# Patient Record
Sex: MALE | Race: WHITE | URBAN - METROPOLITAN AREA
[De-identification: names, ages, dates, MRNs, and addresses within clinical notes are randomized per-mention and may not be internally consistent; named-entity substitution may affect disease eponyms.]

---

## 2017-05-02 ENCOUNTER — IMPORTED ENCOUNTER (OUTPATIENT)
Dept: URBAN - METROPOLITAN AREA CLINIC 50 | Facility: CLINIC | Age: 67
End: 2017-05-02

## 2017-11-07 ENCOUNTER — IMPORTED ENCOUNTER (OUTPATIENT)
Dept: URBAN - METROPOLITAN AREA CLINIC 50 | Facility: CLINIC | Age: 67
End: 2017-11-07

## 2018-05-08 ENCOUNTER — IMPORTED ENCOUNTER (OUTPATIENT)
Dept: URBAN - METROPOLITAN AREA CLINIC 50 | Facility: CLINIC | Age: 68
End: 2018-05-08

## 2018-11-06 ENCOUNTER — IMPORTED ENCOUNTER (OUTPATIENT)
Dept: URBAN - METROPOLITAN AREA CLINIC 50 | Facility: CLINIC | Age: 68
End: 2018-11-06

## 2019-05-07 ENCOUNTER — IMPORTED ENCOUNTER (OUTPATIENT)
Dept: URBAN - METROPOLITAN AREA CLINIC 50 | Facility: CLINIC | Age: 69
End: 2019-05-07

## 2019-05-21 ENCOUNTER — IMPORTED ENCOUNTER (OUTPATIENT)
Dept: URBAN - METROPOLITAN AREA CLINIC 50 | Facility: CLINIC | Age: 69
End: 2019-05-21

## 2019-12-09 ENCOUNTER — IMPORTED ENCOUNTER (OUTPATIENT)
Dept: URBAN - METROPOLITAN AREA CLINIC 50 | Facility: CLINIC | Age: 69
End: 2019-12-09

## 2020-01-15 ENCOUNTER — IMPORTED ENCOUNTER (OUTPATIENT)
Dept: URBAN - METROPOLITAN AREA CLINIC 50 | Facility: CLINIC | Age: 70
End: 2020-01-15

## 2020-07-15 ENCOUNTER — IMPORTED ENCOUNTER (OUTPATIENT)
Dept: URBAN - METROPOLITAN AREA CLINIC 50 | Facility: CLINIC | Age: 70
End: 2020-07-15

## 2021-01-13 ENCOUNTER — IMPORTED ENCOUNTER (OUTPATIENT)
Dept: URBAN - METROPOLITAN AREA CLINIC 50 | Facility: CLINIC | Age: 71
End: 2021-01-13

## 2021-01-13 NOTE — PATIENT DISCUSSION
"""Continue Artificial tears both eyes two - four times a day ."" ""Continue Pazeo both eyes in the morning

## 2021-01-29 NOTE — PATIENT DISCUSSION
Cataract APPEARS VISUALLY SIGNIFICANT and patient advised to SEE DR ROSS for treatment. DR Delia Josue WAS INFORMED OF THESE FINDINGS.

## 2021-01-29 NOTE — PATIENT DISCUSSION
1/29/21: APPEARS SIGNIFICANT, BUT VA IS OVERALL PRESERVED ([place] 30). DISCUSSED THE IMPORTANCE OF UNDERGOING CATARACT SURGERY TO FURTHER IMPROVE SUCCESS OF SX. 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  WILL EVAL 3-4 WKS AFTER REMOVAL OF CATARACT SX.

## 2021-01-29 NOTE — PATIENT DISCUSSION
Recommended observation. HEALTHY DIET INCLUDING GREEN LEAFY VEGGIES & OMEGA 3 RECOMMENDED. PaxfireLER GRID MONITORING.

## 2021-01-29 NOTE — PATIENT DISCUSSION
Personally reviewed patients records from referring Physician. FINDINGS WERE COMMUNICATED TO DR Sridhar Bashir.

## 2021-04-18 ASSESSMENT — VISUAL ACUITY
OS_SC: 20/30
OS_BAT: 20/40
OD_PH: 20/30
OS_SC: 20/30-
OD_SC: 20/40
OS_CC: J4@ 19 IN
OS_CC: J1
OD_OTHER: 20/50. 20/60.
OS_SC: 20/20
OS_OTHER: 20/50. 20/60.
OD_PH: 20/30-2
OS_OTHER: 20/40+. 20/50.
OD_OTHER: 20/50. 20/70.
OD_PH: 20/40
OS_SC: 20/20-
OD_BAT: 20/50
OS_SC: 20/20
OD_SC: 20/50+2
OD_CC: J4@ 19 IN
OS_BAT: 20/40
OD_SC: 20/40+2
OS_CC: J1@ 16 IN
OD_SC: 20/30
OS_SC: 20/40
OD_SC: 20/30-
OD_CC: J1@ 16 IN
OD_CC: J1+@ 16 IN
OS_CC: J1+@ 16 IN
OD_CC: J1
OS_OTHER: 20/40. 20/50.
OD_BAT: 20/50
OS_SC: 20/30-2+2
OD_SC: 20/30
OS_BAT: 20/50
OD_OTHER: 20/50. 20/60.
OD_BAT: 20/40
OD_PH: 20/25
OD_SC: 20/50+
OD_PH: 20/25
OD_OTHER: 20/40. 20/50.
OS_SC: 20/20-
OD_SC: 20/40+
OS_SC: 20/30-
OD_BAT: 20/50
OD_SC: 20/30
OS_BAT: 20/40+
OS_OTHER: 20/40. 20/50.

## 2021-04-18 ASSESSMENT — TONOMETRY
OD_IOP_MMHG: 14
OD_IOP_MMHG: 14
OD_IOP_MMHG: 13
OD_IOP_MMHG: 14
OS_IOP_MMHG: 13
OS_IOP_MMHG: 18
OD_IOP_MMHG: 15
OS_IOP_MMHG: 15
OD_IOP_MMHG: 18
OS_IOP_MMHG: 14
OD_IOP_MMHG: 16
OS_IOP_MMHG: 16
OD_IOP_MMHG: 12
OS_IOP_MMHG: 14
OD_IOP_MMHG: 15
OS_IOP_MMHG: 16
OS_IOP_MMHG: 15
OS_IOP_MMHG: 16
OD_IOP_MMHG: 15
OS_IOP_MMHG: 14
OD_IOP_MMHG: 12
OS_IOP_MMHG: 16
OD_IOP_MMHG: 13
OS_IOP_MMHG: 12

## 2021-04-18 ASSESSMENT — PACHYMETRY
OS_CT_UM: 563
OD_CT_UM: 593

## 2021-07-14 ENCOUNTER — 6 MONTH COMPREHENSIVE EXAM (OUTPATIENT)
Dept: URBAN - METROPOLITAN AREA CLINIC 49 | Facility: CLINIC | Age: 71
End: 2021-07-14

## 2021-07-14 DIAGNOSIS — H04.123: ICD-10-CM

## 2021-07-14 DIAGNOSIS — H25.813: ICD-10-CM

## 2021-07-14 DIAGNOSIS — H43.813: ICD-10-CM

## 2021-07-14 DIAGNOSIS — E11.9: ICD-10-CM

## 2021-07-14 DIAGNOSIS — H35.372: ICD-10-CM

## 2021-07-14 DIAGNOSIS — H40.013: ICD-10-CM

## 2021-07-14 DIAGNOSIS — Z79.4: ICD-10-CM

## 2021-07-14 PROCEDURE — 92134 CPTRZ OPH DX IMG PST SGM RTA: CPT

## 2021-07-14 PROCEDURE — 92014 COMPRE OPH EXAM EST PT 1/>: CPT

## 2021-07-14 ASSESSMENT — TONOMETRY
OS_IOP_MMHG: 17
OD_IOP_MMHG: 15
OS_IOP_MMHG: 16
OD_IOP_MMHG: 13

## 2021-07-14 ASSESSMENT — VISUAL ACUITY
OS_GLARE: 20/30
OS_SC: 20/30+2
OD_PH: 20/30
OD_GLARE: 20/50
OD_GLARE: 20/30
OS_GLARE: 20/50
OD_SC: 20/50+2
OU_CC: J1+
OS_PH: 20/25

## 2021-07-14 NOTE — PATIENT DISCUSSION
IOP stable OU. No drop therapy recommend at this time. Schedule HVF/RNFL OCT at or prior to next visit.

## 2021-07-15 ENCOUNTER — EMERGENCY VISIT (OUTPATIENT)
Dept: URBAN - METROPOLITAN AREA CLINIC 49 | Facility: CLINIC | Age: 71
End: 2021-07-15

## 2021-07-15 DIAGNOSIS — Z79.4: ICD-10-CM

## 2021-07-15 DIAGNOSIS — H10.13: ICD-10-CM

## 2021-07-15 PROCEDURE — 92012 INTRM OPH EXAM EST PATIENT: CPT

## 2021-07-15 RX ORDER — TOBRAMYCIN AND DEXAMETHASONE 1; 3 MG/ML; MG/ML
1 SUSPENSION/ DROPS OPHTHALMIC TWICE A DAY
Start: 2021-07-15 | End: 2021-07-22

## 2021-07-15 ASSESSMENT — TONOMETRY
OD_IOP_MMHG: 15
OS_IOP_MMHG: 14
OS_IOP_MMHG: 15
OD_IOP_MMHG: 13

## 2021-07-15 ASSESSMENT — VISUAL ACUITY
OS_CC: 20/30
OD_PH: 20/30
OD_CC: 20/40

## 2021-07-15 NOTE — PATIENT DISCUSSION
possible allergy to a drop,  unknown if it is fluress or dilation drops.  recommend tobradex twice a day for 7 days.  then stop.  start pataday green label once a day.

## 2021-09-24 NOTE — PATIENT DISCUSSION
"""IOP controlled OU
"""Improved.  Use artificial tears in both eyes 2-4 times a day (Systane Complete or Refresh ""
97.5

## 2022-02-09 ENCOUNTER — FOLLOW UP (OUTPATIENT)
Dept: URBAN - METROPOLITAN AREA CLINIC 49 | Facility: CLINIC | Age: 72
End: 2022-02-09

## 2022-02-09 DIAGNOSIS — H40.013: ICD-10-CM

## 2022-02-09 DIAGNOSIS — Z79.4: ICD-10-CM

## 2022-02-09 PROCEDURE — 92012 INTRM OPH EXAM EST PATIENT: CPT

## 2022-02-09 PROCEDURE — 92133 CPTRZD OPH DX IMG PST SGM ON: CPT

## 2022-02-09 PROCEDURE — 92083 EXTENDED VISUAL FIELD XM: CPT

## 2022-02-09 ASSESSMENT — TONOMETRY
OS_IOP_MMHG: 14
OS_IOP_MMHG: 13
OD_IOP_MMHG: 13
OD_IOP_MMHG: 15

## 2022-02-09 ASSESSMENT — VISUAL ACUITY
OD_PH: 20/30
OS_SC: 20/30
OD_SC: 20/50

## 2022-02-09 NOTE — PATIENT DISCUSSION
IOP stable OU. hvf/oct rnfl done today and is WNL.  No drop therapy recommend at this time. recommend dilated exam in 6 months.

## 2022-08-17 ENCOUNTER — COMPREHENSIVE EXAM (OUTPATIENT)
Dept: URBAN - METROPOLITAN AREA CLINIC 49 | Facility: CLINIC | Age: 72
End: 2022-08-17

## 2022-08-17 DIAGNOSIS — E11.9: ICD-10-CM

## 2022-08-17 DIAGNOSIS — H40.013: ICD-10-CM

## 2022-08-17 DIAGNOSIS — G43.B0: ICD-10-CM

## 2022-08-17 DIAGNOSIS — H25.813: ICD-10-CM

## 2022-08-17 DIAGNOSIS — H35.372: ICD-10-CM

## 2022-08-17 DIAGNOSIS — Z79.4: ICD-10-CM

## 2022-08-17 DIAGNOSIS — H43.813: ICD-10-CM

## 2022-08-17 PROCEDURE — 92134 CPTRZ OPH DX IMG PST SGM RTA: CPT

## 2022-08-17 PROCEDURE — 92014 COMPRE OPH EXAM EST PT 1/>: CPT

## 2022-08-17 ASSESSMENT — VISUAL ACUITY
OD_PH: 20/30
OD_GLARE: 20/40
OD_GLARE: 20/40
OS_SC: 20/50
OD_SC: 20/50
OS_GLARE: 20/40
OS_GLARE: 20/40

## 2022-08-17 ASSESSMENT — TONOMETRY
OS_IOP_MMHG: 15
OS_IOP_MMHG: 14
OD_IOP_MMHG: 15
OD_IOP_MMHG: 13

## 2022-08-17 NOTE — PATIENT DISCUSSION
Increased c:d ratio. The IOP is in the target range without the need for drop therapy. Schedule HVF/RNFL OCT at next visit.

## 2022-08-17 NOTE — PATIENT DISCUSSION
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

## 2023-08-09 ENCOUNTER — COMPREHENSIVE EXAM (OUTPATIENT)
Dept: URBAN - METROPOLITAN AREA CLINIC 49 | Facility: LOCATION | Age: 73
End: 2023-08-09

## 2023-08-09 DIAGNOSIS — E11.9: ICD-10-CM

## 2023-08-09 DIAGNOSIS — H35.033: ICD-10-CM

## 2023-08-09 DIAGNOSIS — H40.013: ICD-10-CM

## 2023-08-09 DIAGNOSIS — H43.813: ICD-10-CM

## 2023-08-09 DIAGNOSIS — Z79.4: ICD-10-CM

## 2023-08-09 DIAGNOSIS — H35.372: ICD-10-CM

## 2023-08-09 PROCEDURE — 92015 DETERMINE REFRACTIVE STATE: CPT

## 2023-08-09 PROCEDURE — 99214 OFFICE O/P EST MOD 30 MIN: CPT

## 2023-08-09 ASSESSMENT — VISUAL ACUITY
OD_SC: 20/80
OD_PH: 20/30
OD_GLARE: 20/70
OD_GLARE: 20/70
OS_SC: 20/40-2
OU_CC: J3 @ 16"

## 2023-08-09 ASSESSMENT — TONOMETRY
OS_IOP_MMHG: 13
OD_IOP_MMHG: 16
OD_IOP_MMHG: 14
OS_IOP_MMHG: 14

## 2024-02-07 ENCOUNTER — DIAGNOSTICS ONLY (OUTPATIENT)
Dept: URBAN - METROPOLITAN AREA CLINIC 49 | Facility: LOCATION | Age: 74
End: 2024-02-07

## 2024-02-07 DIAGNOSIS — H40.013: ICD-10-CM

## 2024-02-07 PROCEDURE — 92133 CPTRZD OPH DX IMG PST SGM ON: CPT

## 2024-02-07 PROCEDURE — 92083 EXTENDED VISUAL FIELD XM: CPT

## 2024-08-14 ENCOUNTER — COMPREHENSIVE EXAM (OUTPATIENT)
Dept: URBAN - METROPOLITAN AREA CLINIC 49 | Facility: LOCATION | Age: 74
End: 2024-08-14

## 2024-08-14 DIAGNOSIS — H25.813: ICD-10-CM

## 2024-08-14 DIAGNOSIS — H35.372: ICD-10-CM

## 2024-08-14 DIAGNOSIS — H04.123: ICD-10-CM

## 2024-08-14 DIAGNOSIS — H43.813: ICD-10-CM

## 2024-08-14 DIAGNOSIS — H52.4: ICD-10-CM

## 2024-08-14 DIAGNOSIS — E11.9: ICD-10-CM

## 2024-08-14 DIAGNOSIS — H40.013: ICD-10-CM

## 2024-08-14 DIAGNOSIS — H35.033: ICD-10-CM

## 2024-08-14 DIAGNOSIS — Z79.4: ICD-10-CM

## 2024-08-14 PROCEDURE — 92134 CPTRZ OPH DX IMG PST SGM RTA: CPT

## 2024-08-14 PROCEDURE — 99214 OFFICE O/P EST MOD 30 MIN: CPT

## 2024-08-14 ASSESSMENT — VISUAL ACUITY
OD_SC: 20/70
OD_GLARE: <400
OU_CC: J1-2
OS_GLARE: 20/400
OS_SC: 20/50
OD_PH: 20/40

## 2024-08-14 ASSESSMENT — TONOMETRY
OS_IOP_MMHG: 15
OS_IOP_MMHG: 16
OD_IOP_MMHG: 16
OD_IOP_MMHG: 18

## 2025-07-10 ENCOUNTER — DIAGNOSTICS ONLY (OUTPATIENT)
Age: 75
End: 2025-07-10

## 2025-07-10 DIAGNOSIS — H40.013: ICD-10-CM

## 2025-07-10 PROCEDURE — 92133 CPTRZD OPH DX IMG PST SGM ON: CPT

## 2025-07-10 PROCEDURE — 92083 EXTENDED VISUAL FIELD XM: CPT

## 2025-07-23 ENCOUNTER — COMPREHENSIVE EXAM (OUTPATIENT)
Age: 75
End: 2025-07-23

## 2025-07-23 DIAGNOSIS — H43.813: ICD-10-CM

## 2025-07-23 DIAGNOSIS — G43.109: ICD-10-CM

## 2025-07-23 DIAGNOSIS — E11.9: ICD-10-CM

## 2025-07-23 DIAGNOSIS — Z79.4: ICD-10-CM

## 2025-07-23 DIAGNOSIS — H04.123: ICD-10-CM

## 2025-07-23 DIAGNOSIS — H35.033: ICD-10-CM

## 2025-07-23 DIAGNOSIS — H35.372: ICD-10-CM

## 2025-07-23 DIAGNOSIS — H25.813: ICD-10-CM

## 2025-07-23 DIAGNOSIS — H40.013: ICD-10-CM

## 2025-07-23 DIAGNOSIS — H52.4: ICD-10-CM

## 2025-07-23 PROCEDURE — 99214 OFFICE O/P EST MOD 30 MIN: CPT

## 2025-07-23 PROCEDURE — 92134 CPTRZ OPH DX IMG PST SGM RTA: CPT
